# Patient Record
Sex: FEMALE | Race: WHITE | Employment: FULL TIME | URBAN - METROPOLITAN AREA
[De-identification: names, ages, dates, MRNs, and addresses within clinical notes are randomized per-mention and may not be internally consistent; named-entity substitution may affect disease eponyms.]

---

## 2019-12-27 ENCOUNTER — HOSPITAL ENCOUNTER (EMERGENCY)
Age: 54
Discharge: HOME | End: 2019-12-27
Attending: EMERGENCY MEDICINE
Payer: COMMERCIAL

## 2019-12-27 ENCOUNTER — APPOINTMENT (OUTPATIENT)
Dept: ULTRASOUND IMAGING | Age: 54
End: 2019-12-27
Attending: EMERGENCY MEDICINE
Payer: COMMERCIAL

## 2019-12-27 VITALS
TEMPERATURE: 98 F | BODY MASS INDEX: 29.23 KG/M2 | HEIGHT: 63 IN | WEIGHT: 165 LBS | OXYGEN SATURATION: 99 % | SYSTOLIC BLOOD PRESSURE: 147 MMHG | DIASTOLIC BLOOD PRESSURE: 98 MMHG | HEART RATE: 89 BPM | RESPIRATION RATE: 18 BRPM

## 2019-12-27 DIAGNOSIS — K52.9 GASTROENTERITIS, ACUTE: Primary | ICD-10-CM

## 2019-12-27 LAB
ALBUMIN SERPL-MCNC: 3.9 G/DL (ref 3.5–5)
ALBUMIN/GLOB SERPL: 1 {RATIO} (ref 1.1–2.2)
ALP SERPL-CCNC: 90 U/L (ref 45–117)
ALT SERPL-CCNC: 32 U/L (ref 12–78)
ANION GAP SERPL CALC-SCNC: 4 MMOL/L (ref 5–15)
APPEARANCE UR: CLEAR
AST SERPL-CCNC: 28 U/L (ref 15–37)
BACTERIA URNS QL MICRO: NEGATIVE /HPF
BASOPHILS # BLD: 0 K/UL (ref 0–0.1)
BASOPHILS NFR BLD: 1 % (ref 0–1)
BILIRUB SERPL-MCNC: 0.5 MG/DL (ref 0.2–1)
BILIRUB UR QL: NEGATIVE
BUN SERPL-MCNC: 10 MG/DL (ref 6–20)
BUN/CREAT SERPL: 15 (ref 12–20)
CALCIUM SERPL-MCNC: 9.5 MG/DL (ref 8.5–10.1)
CHLORIDE SERPL-SCNC: 105 MMOL/L (ref 97–108)
CO2 SERPL-SCNC: 28 MMOL/L (ref 21–32)
COLOR UR: ABNORMAL
COMMENT, HOLDF: NORMAL
CREAT SERPL-MCNC: 0.68 MG/DL (ref 0.55–1.02)
DIFFERENTIAL METHOD BLD: NORMAL
EOSINOPHIL # BLD: 0.1 K/UL (ref 0–0.4)
EOSINOPHIL NFR BLD: 2 % (ref 0–7)
EPITH CASTS URNS QL MICRO: ABNORMAL /LPF
ERYTHROCYTE [DISTWIDTH] IN BLOOD BY AUTOMATED COUNT: 12.1 % (ref 11.5–14.5)
GLOBULIN SER CALC-MCNC: 3.8 G/DL (ref 2–4)
GLUCOSE SERPL-MCNC: 90 MG/DL (ref 65–100)
GLUCOSE UR STRIP.AUTO-MCNC: NEGATIVE MG/DL
HCT VFR BLD AUTO: 45.3 % (ref 35–47)
HGB BLD-MCNC: 14.9 G/DL (ref 11.5–16)
HGB UR QL STRIP: NEGATIVE
HYALINE CASTS URNS QL MICRO: ABNORMAL /LPF (ref 0–5)
IMM GRANULOCYTES # BLD AUTO: 0 K/UL (ref 0–0.04)
IMM GRANULOCYTES NFR BLD AUTO: 0 % (ref 0–0.5)
KETONES UR QL STRIP.AUTO: NEGATIVE MG/DL
LEUKOCYTE ESTERASE UR QL STRIP.AUTO: ABNORMAL
LIPASE SERPL-CCNC: 144 U/L (ref 73–393)
LYMPHOCYTES # BLD: 2 K/UL (ref 0.8–3.5)
LYMPHOCYTES NFR BLD: 29 % (ref 12–49)
MCH RBC QN AUTO: 30.3 PG (ref 26–34)
MCHC RBC AUTO-ENTMCNC: 32.9 G/DL (ref 30–36.5)
MCV RBC AUTO: 92.3 FL (ref 80–99)
MONOCYTES # BLD: 0.9 K/UL (ref 0–1)
MONOCYTES NFR BLD: 13 % (ref 5–13)
NEUTS SEG # BLD: 3.7 K/UL (ref 1.8–8)
NEUTS SEG NFR BLD: 55 % (ref 32–75)
NITRITE UR QL STRIP.AUTO: NEGATIVE
NRBC # BLD: 0 K/UL (ref 0–0.01)
NRBC BLD-RTO: 0 PER 100 WBC
PH UR STRIP: 7 [PH] (ref 5–8)
PLATELET # BLD AUTO: 317 K/UL (ref 150–400)
PMV BLD AUTO: 9.2 FL (ref 8.9–12.9)
POTASSIUM SERPL-SCNC: 4 MMOL/L (ref 3.5–5.1)
PROT SERPL-MCNC: 7.7 G/DL (ref 6.4–8.2)
PROT UR STRIP-MCNC: NEGATIVE MG/DL
RBC # BLD AUTO: 4.91 M/UL (ref 3.8–5.2)
RBC #/AREA URNS HPF: ABNORMAL /HPF (ref 0–5)
SAMPLES BEING HELD,HOLD: NORMAL
SODIUM SERPL-SCNC: 137 MMOL/L (ref 136–145)
SP GR UR REFRACTOMETRY: 1.02 (ref 1–1.03)
UR CULT HOLD, URHOLD: NORMAL
UROBILINOGEN UR QL STRIP.AUTO: 0.2 EU/DL (ref 0.2–1)
WBC # BLD AUTO: 6.8 K/UL (ref 3.6–11)
WBC URNS QL MICRO: ABNORMAL /HPF (ref 0–4)

## 2019-12-27 PROCEDURE — 81001 URINALYSIS AUTO W/SCOPE: CPT

## 2019-12-27 PROCEDURE — 76705 ECHO EXAM OF ABDOMEN: CPT

## 2019-12-27 PROCEDURE — 36415 COLL VENOUS BLD VENIPUNCTURE: CPT

## 2019-12-27 PROCEDURE — 83690 ASSAY OF LIPASE: CPT

## 2019-12-27 PROCEDURE — 85025 COMPLETE CBC W/AUTO DIFF WBC: CPT

## 2019-12-27 PROCEDURE — 96375 TX/PRO/DX INJ NEW DRUG ADDON: CPT

## 2019-12-27 PROCEDURE — 80053 COMPREHEN METABOLIC PANEL: CPT

## 2019-12-27 PROCEDURE — 99283 EMERGENCY DEPT VISIT LOW MDM: CPT

## 2019-12-27 PROCEDURE — 96374 THER/PROPH/DIAG INJ IV PUSH: CPT

## 2019-12-27 PROCEDURE — 74011250636 HC RX REV CODE- 250/636: Performed by: EMERGENCY MEDICINE

## 2019-12-27 RX ORDER — TRAZODONE HYDROCHLORIDE 150 MG/1
150 TABLET ORAL
COMMUNITY

## 2019-12-27 RX ORDER — FAMOTIDINE 10 MG/ML
20 INJECTION INTRAVENOUS
Status: COMPLETED | OUTPATIENT
Start: 2019-12-27 | End: 2019-12-27

## 2019-12-27 RX ORDER — ONDANSETRON 4 MG/1
4 TABLET, ORALLY DISINTEGRATING ORAL
Qty: 10 TAB | Refills: 0 | Status: SHIPPED | OUTPATIENT
Start: 2019-12-27 | End: 2021-01-04 | Stop reason: ALTCHOICE

## 2019-12-27 RX ORDER — DESVENLAFAXINE 100 MG/1
1 TABLET, EXTENDED RELEASE ORAL DAILY
COMMUNITY

## 2019-12-27 RX ORDER — ONDANSETRON 2 MG/ML
4 INJECTION INTRAMUSCULAR; INTRAVENOUS
Status: COMPLETED | OUTPATIENT
Start: 2019-12-27 | End: 2019-12-27

## 2019-12-27 RX ORDER — OMEPRAZOLE 20 MG/1
20 CAPSULE, DELAYED RELEASE ORAL AS NEEDED
COMMUNITY

## 2019-12-27 RX ADMIN — ONDANSETRON 4 MG: 2 INJECTION INTRAMUSCULAR; INTRAVENOUS at 13:02

## 2019-12-27 RX ADMIN — FAMOTIDINE 20 MG: 10 INJECTION, SOLUTION INTRAVENOUS at 13:02

## 2019-12-27 RX ADMIN — SODIUM CHLORIDE 1000 ML: 900 INJECTION, SOLUTION INTRAVENOUS at 13:02

## 2019-12-27 NOTE — ED PROVIDER NOTES
48 y.o. female with no significant past medical history who presents from home with chief complaint of abdominal pain. Pt complains of RUQ abdominal pain with associated dizziness, nausea, vomiting, and diarrhea for the past 5 days. Pt notes symptoms onset with nausea, vomiting, and \"heartburn\" 5 days ago and then had onset of diarrhea 3 days ago. Pt states the vomiting and diarrhea subsided yesterday, but the other symptoms have persisted. Pt was evaluated at [facility] today, and referred to the ED. Pt reports recent sick contact with similar symptoms. She notes that she began taking Omeprazole beginning Tuesday with some relief. She denies hx of lung problems, and denies suspicious food intake, hematemesis, or blood in stool. There are no other acute medical concerns at this time. Social hx: Denies tobacco use. EtOH use- 1 glass of wine/day. Denies illicit drug use. Surgical hx: hysterectomy, oophorectomy   PCP: No primary care provider on file. Note written by Miguelina Perla, as dictated by Garcia Coy MD 12:42 PM            The history is provided by the patient. No  was used. No past medical history on file. No past surgical history on file. No family history on file.     Social History     Socioeconomic History    Marital status: Not on file     Spouse name: Not on file    Number of children: Not on file    Years of education: Not on file    Highest education level: Not on file   Occupational History    Not on file   Social Needs    Financial resource strain: Not on file    Food insecurity:     Worry: Not on file     Inability: Not on file    Transportation needs:     Medical: Not on file     Non-medical: Not on file   Tobacco Use    Smoking status: Not on file   Substance and Sexual Activity    Alcohol use: Not on file    Drug use: Not on file    Sexual activity: Not on file   Lifestyle    Physical activity:     Days per week: Not on file Minutes per session: Not on file    Stress: Not on file   Relationships    Social connections:     Talks on phone: Not on file     Gets together: Not on file     Attends Holiness service: Not on file     Active member of club or organization: Not on file     Attends meetings of clubs or organizations: Not on file     Relationship status: Not on file    Intimate partner violence:     Fear of current or ex partner: Not on file     Emotionally abused: Not on file     Physically abused: Not on file     Forced sexual activity: Not on file   Other Topics Concern    Not on file   Social History Narrative    Not on file         ALLERGIES: Morphine and Sulfa (sulfonamide antibiotics)    Review of Systems   Constitutional: Negative for chills and fever. Eyes: Negative for visual disturbance. Respiratory: Negative for cough. Cardiovascular: Negative for chest pain. Gastrointestinal: Positive for abdominal pain, diarrhea, nausea and vomiting. Negative for blood in stool. Genitourinary: Negative for hematuria. Musculoskeletal: Negative for arthralgias, myalgias and neck pain. Skin: Negative for color change and rash. Neurological: Positive for dizziness. All other systems reviewed and are negative. Vitals:    12/27/19 1204   BP: (!) 147/98   Pulse: 89   Resp: 18   Temp: 98 °F (36.7 °C)   SpO2: 99%   Weight: 74.8 kg (165 lb)   Height: 5' 3\" (1.6 m)            Physical Exam  Vitals signs and nursing note reviewed. Constitutional:       Appearance: She is well-developed. HENT:      Head: Normocephalic and atraumatic. Eyes:      Conjunctiva/sclera: Conjunctivae normal.   Neck:      Musculoskeletal: Normal range of motion. Cardiovascular:      Rate and Rhythm: Normal rate and regular rhythm. Pulses: Normal pulses. Heart sounds: Normal heart sounds. No murmur. Pulmonary:      Effort: Pulmonary effort is normal. No respiratory distress. Breath sounds: Normal breath sounds.  No stridor. No wheezing, rhonchi or rales. Chest:      Chest wall: No tenderness. Abdominal:      General: Bowel sounds are normal. There is no distension. Palpations: Abdomen is soft. Tenderness: There is tenderness. There is no guarding or rebound. Comments: Minimal right upper quadrant tenderness to palpation   Musculoskeletal: Normal range of motion. Skin:     General: Skin is warm and dry. Neurological:      Mental Status: She is alert and oriented to person, place, and time. MDM  Number of Diagnoses or Management Options  Gastroenteritis, acute:   Diagnosis management comments: Suspect these are symptoms of gastroenteritis. Patient has not vomited today and has not had any more diarrhea. However given she was sent over here for possible cholecystitis and has mild right upper quadrant tenderness to palpation I will obtain an ultrasound. We will try Pepcid and Zofran per patient's request.       Amount and/or Complexity of Data Reviewed  Clinical lab tests: ordered and reviewed  Tests in the radiology section of CPT®: ordered and reviewed    Patient Progress  Patient progress: stable         Procedures    2:42 PM  Patient's results have been reviewed with them. Patient and/or family have verbally conveyed their understanding and agreement of the patient's signs, symptoms, diagnosis, treatment and prognosis and additionally agree to follow up as recommended or return to the Emergency Room should their condition change prior to follow-up. Discharge instructions have also been provided to the patient with some educational information regarding their diagnosis as well a list of reasons why they would want to return to the ER prior to their follow-up appointment should their condition change.

## 2019-12-27 NOTE — DISCHARGE INSTRUCTIONS
Patient Education        Gastroenteritis: Care Instructions  Your Care Instructions    Gastroenteritis is an illness that may cause nausea, vomiting, and diarrhea. It is sometimes called \"stomach flu. \" It can be caused by bacteria or a virus. You will probably begin to feel better in 1 to 2 days. In the meantime, get plenty of rest and make sure you do not become dehydrated. Dehydration occurs when your body loses too much fluid. Follow-up care is a key part of your treatment and safety. Be sure to make and go to all appointments, and call your doctor if you are having problems. It's also a good idea to know your test results and keep a list of the medicines you take. How can you care for yourself at home? · If your doctor prescribed antibiotics, take them as directed. Do not stop taking them just because you feel better. You need to take the full course of antibiotics. · Drink plenty of fluids to prevent dehydration, enough so that your urine is light yellow or clear like water. Choose water and other caffeine-free clear liquids until you feel better. If you have kidney, heart, or liver disease and have to limit fluids, talk with your doctor before you increase your fluid intake. · Drink fluids slowly, in frequent, small amounts, because drinking too much too fast can cause vomiting. · Begin eating mild foods, such as dry toast, yogurt, applesauce, bananas, and rice. Avoid spicy, hot, or high-fat foods, and do not drink alcohol or caffeine for a day or two. Do not drink milk or eat ice cream until you are feeling better. How to prevent gastroenteritis  · Keep hot foods hot and cold foods cold. · Do not eat meats, dressings, salads, or other foods that have been kept at room temperature for more than 2 hours. · Use a thermometer to check your refrigerator. It should be between 34°F and 40°F.  · Defrost meats in the refrigerator or microwave, not on the kitchen counter.   · Keep your hands and your kitchen clean. Wash your hands, cutting boards, and countertops with hot soapy water frequently. · Cook meat until it is well done. · Do not eat raw eggs or uncooked sauces made with raw eggs. · Do not take chances. If food looks or tastes spoiled, throw it out. When should you call for help? Call 911 anytime you think you may need emergency care. For example, call if:    · You vomit blood or what looks like coffee grounds.     · You passed out (lost consciousness).     · You pass maroon or very bloody stools.    Call your doctor now or seek immediate medical care if:    · You have severe belly pain.     · You have signs of needing more fluids. You have sunken eyes, a dry mouth, and pass only a little dark urine.     · You feel like you are going to faint.     · You have increased belly pain that does not go away in 1 to 2 days.     · You have new or increased nausea, or you are vomiting.     · You have a new or higher fever.     · Your stools are black and tarlike or have streaks of blood.    Watch closely for changes in your health, and be sure to contact your doctor if:    · You are dizzy or lightheaded.     · You urinate less than usual, or your urine is dark yellow or brown.     · You do not feel better with each day that goes by. Where can you learn more? Go to http://sivakumar-ale.info/. Enter N142 in the search box to learn more about \"Gastroenteritis: Care Instructions. \"  Current as of: June 9, 2019  Content Version: 12.2  © 7419-2903 [employer]. Care instructions adapted under license by [employer] (which disclaims liability or warranty for this information). If you have questions about a medical condition or this instruction, always ask your healthcare professional. [place] [address] any warranty or liability for your use of this information.

## 2019-12-27 NOTE — LETTER
[address] 
[facility] EMERGENCY DEPT 
[address] [place] [id number] [phone] Work/School Note Date: 12/27/2019 To Whom It May concern: 
 
Alok Harris was seen and treated today in the emergency room by the following provider(s): 
Attending Provider: Aranza Stafford MD.   
 
Alok Harris may return to work on 12/28/19. Sincerely, Bar Man

## 2020-11-12 ENCOUNTER — APPOINTMENT (OUTPATIENT)
Dept: GENERAL RADIOLOGY | Age: 55
End: 2020-11-12
Attending: EMERGENCY MEDICINE
Payer: COMMERCIAL

## 2020-11-12 ENCOUNTER — HOSPITAL ENCOUNTER (EMERGENCY)
Age: 55
Discharge: HOME | End: 2020-11-12
Attending: EMERGENCY MEDICINE
Payer: COMMERCIAL

## 2020-11-12 VITALS
TEMPERATURE: 98.1 F | BODY MASS INDEX: 30.07 KG/M2 | SYSTOLIC BLOOD PRESSURE: 144 MMHG | HEART RATE: 106 BPM | DIASTOLIC BLOOD PRESSURE: 90 MMHG | OXYGEN SATURATION: 93 % | RESPIRATION RATE: 19 BRPM | WEIGHT: 169.75 LBS

## 2020-11-12 DIAGNOSIS — R07.9 RIGHT-SIDED CHEST PAIN: Primary | ICD-10-CM

## 2020-11-12 DIAGNOSIS — R00.0 SINUS TACHYCARDIA: ICD-10-CM

## 2020-11-12 LAB
ALBUMIN SERPL-MCNC: 3.7 G/DL (ref 3.5–5)
ALBUMIN/GLOB SERPL: 1 {RATIO} (ref 1.1–2.2)
ALP SERPL-CCNC: 83 U/L (ref 45–117)
ALT SERPL-CCNC: 59 U/L (ref 12–78)
ANION GAP SERPL CALC-SCNC: 8 MMOL/L (ref 5–15)
AST SERPL-CCNC: 42 U/L (ref 15–37)
BASOPHILS # BLD: 0.1 K/UL (ref 0–0.1)
BASOPHILS NFR BLD: 1 % (ref 0–1)
BILIRUB SERPL-MCNC: 0.4 MG/DL (ref 0.2–1)
BNP SERPL-MCNC: 14 PG/ML (ref 0–125)
BUN SERPL-MCNC: 16 MG/DL (ref 6–20)
BUN/CREAT SERPL: 17 (ref 12–20)
CALCIUM SERPL-MCNC: 9 MG/DL (ref 8.5–10.1)
CHLORIDE SERPL-SCNC: 103 MMOL/L (ref 97–108)
CO2 SERPL-SCNC: 28 MMOL/L (ref 21–32)
CREAT SERPL-MCNC: 0.96 MG/DL (ref 0.55–1.02)
D DIMER PPP FEU-MCNC: 0.32 MG/L FEU (ref 0–0.65)
DIFFERENTIAL METHOD BLD: ABNORMAL
EOSINOPHIL # BLD: 0.1 K/UL (ref 0–0.4)
EOSINOPHIL NFR BLD: 1 % (ref 0–7)
ERYTHROCYTE [DISTWIDTH] IN BLOOD BY AUTOMATED COUNT: 12.6 % (ref 11.5–14.5)
GLOBULIN SER CALC-MCNC: 3.6 G/DL (ref 2–4)
GLUCOSE SERPL-MCNC: 124 MG/DL (ref 65–100)
HCT VFR BLD AUTO: 45.3 % (ref 35–47)
HGB BLD-MCNC: 14.4 G/DL (ref 11.5–16)
IMM GRANULOCYTES # BLD AUTO: 0 K/UL (ref 0–0.04)
IMM GRANULOCYTES NFR BLD AUTO: 1 % (ref 0–0.5)
LYMPHOCYTES # BLD: 3.1 K/UL (ref 0.8–3.5)
LYMPHOCYTES NFR BLD: 37 % (ref 12–49)
MAGNESIUM SERPL-MCNC: 1.9 MG/DL (ref 1.6–2.4)
MCH RBC QN AUTO: 29.5 PG (ref 26–34)
MCHC RBC AUTO-ENTMCNC: 31.8 G/DL (ref 30–36.5)
MCV RBC AUTO: 92.8 FL (ref 80–99)
MONOCYTES # BLD: 0.7 K/UL (ref 0–1)
MONOCYTES NFR BLD: 8 % (ref 5–13)
NEUTS SEG # BLD: 4.5 K/UL (ref 1.8–8)
NEUTS SEG NFR BLD: 53 % (ref 32–75)
NRBC # BLD: 0 K/UL (ref 0–0.01)
NRBC BLD-RTO: 0 PER 100 WBC
PLATELET # BLD AUTO: 332 K/UL (ref 150–400)
PMV BLD AUTO: 9.2 FL (ref 8.9–12.9)
POTASSIUM SERPL-SCNC: 3.6 MMOL/L (ref 3.5–5.1)
PROT SERPL-MCNC: 7.3 G/DL (ref 6.4–8.2)
RBC # BLD AUTO: 4.88 M/UL (ref 3.8–5.2)
SODIUM SERPL-SCNC: 139 MMOL/L (ref 136–145)
TROPONIN I SERPL-MCNC: <0.05 NG/ML
WBC # BLD AUTO: 8.5 K/UL (ref 3.6–11)

## 2020-11-12 PROCEDURE — 71046 X-RAY EXAM CHEST 2 VIEWS: CPT

## 2020-11-12 PROCEDURE — 83880 ASSAY OF NATRIURETIC PEPTIDE: CPT

## 2020-11-12 PROCEDURE — 85379 FIBRIN DEGRADATION QUANT: CPT

## 2020-11-12 PROCEDURE — 93005 ELECTROCARDIOGRAM TRACING: CPT

## 2020-11-12 PROCEDURE — 80053 COMPREHEN METABOLIC PANEL: CPT

## 2020-11-12 PROCEDURE — 83735 ASSAY OF MAGNESIUM: CPT

## 2020-11-12 PROCEDURE — 84484 ASSAY OF TROPONIN QUANT: CPT

## 2020-11-12 PROCEDURE — 36415 COLL VENOUS BLD VENIPUNCTURE: CPT

## 2020-11-12 PROCEDURE — 99285 EMERGENCY DEPT VISIT HI MDM: CPT

## 2020-11-12 PROCEDURE — 85025 COMPLETE CBC W/AUTO DIFF WBC: CPT

## 2020-11-12 RX ORDER — AMLODIPINE BESYLATE 5 MG/1
5 TABLET ORAL DAILY
COMMUNITY
End: 2021-01-04 | Stop reason: ALTCHOICE

## 2020-11-12 RX ORDER — ARIPIPRAZOLE 2 MG/1
2 TABLET ORAL DAILY
COMMUNITY

## 2020-11-12 RX ORDER — MINERAL OIL
180 ENEMA (ML) RECTAL
COMMUNITY
End: 2021-01-04 | Stop reason: ALTCHOICE

## 2020-11-12 NOTE — Clinical Note
[address] [facility] EMERGENCY DEPT 
[address] [facility] [place] [id number] 
[phone] Work/School Note Date: 11/12/2020 To Whom It May concern: 
 
 
Gabby Dyson was seen and treated today in the emergency room by the following provider(s): 
Attending Provider: Kang Boyd MD.   
 
Gabby Dyson is excused from work/school on 11/12/20. She is clear to return to work/school on 11/13/20.    
 
 
Sincerely, 
 
 
 
 
Jagdeep Balderrama MD

## 2020-11-12 NOTE — ED NOTES
Pt given discharge instructions by Dr Rhoda Paul she verbalizes an understanding pt stable at time of discharge ambulates to ren

## 2020-11-12 NOTE — ED TRIAGE NOTES
Pt assisted to treatment area she states that on Tuesday about 1430 she was outside working with the animals and she began to have right lower chest pain with a little bit of nausea. She states that the pain has continued along with some dizziness and just not feeling right. She denies any vomiting, SOB or diaphoresis with the pain. She has not taken anything for the pain.

## 2020-11-12 NOTE — ED PROVIDER NOTES
The history is provided by the patient. Chest Pain (Angina)    This is a new problem. The current episode started 2 days ago. The problem has not changed since onset. Duration of episode(s) is 2 days. The problem occurs constantly. The pain is associated with exertion. The pain is mild. The quality of the pain is described as pressure-like. The pain does not radiate. Associated symptoms include dizziness and malaise/fatigue. Pertinent negatives include no vomiting. She has tried nothing for the symptoms. Risk factors include hypertension. Dizziness   Associated symptoms include chest pain. Pertinent negatives include no vomiting. Past Medical History:   Diagnosis Date    Acid reflux     Hypertension        Past Surgical History:   Procedure Laterality Date    HX HYSTERECTOMY      HX OOPHORECTOMY           History reviewed. No pertinent family history. Social History     Socioeconomic History    Marital status: SINGLE     Spouse name: Not on file    Number of children: Not on file    Years of education: Not on file    Highest education level: Not on file   Occupational History    Not on file   Social Needs    Financial resource strain: Not on file    Food insecurity     Worry: Not on file     Inability: Not on file    Transportation needs     Medical: Not on file     Non-medical: Not on file   Tobacco Use    Smoking status: Current Every Day Smoker     Packs/day: 0.25    Smokeless tobacco: Never Used   Substance and Sexual Activity    Alcohol use:  Yes     Alcohol/week: 7.0 standard drinks     Types: 7 Glasses of wine per week    Drug use: Never    Sexual activity: Not on file   Lifestyle    Physical activity     Days per week: Not on file     Minutes per session: Not on file    Stress: Not on file   Relationships    Social connections     Talks on phone: Not on file     Gets together: Not on file     Attends Muslim service: Not on file     Active member of club or organization: Not on file     Attends meetings of clubs or organizations: Not on file     Relationship status: Not on file    Intimate partner violence     Fear of current or ex partner: Not on file     Emotionally abused: Not on file     Physically abused: Not on file     Forced sexual activity: Not on file   Other Topics Concern    Not on file   Social History Narrative    Not on file         ALLERGIES: Morphine and Sulfa (sulfonamide antibiotics)    Review of Systems   Constitutional: Positive for malaise/fatigue. Cardiovascular: Positive for chest pain. Gastrointestinal: Negative for vomiting. Neurological: Positive for dizziness. All other systems reviewed and are negative. Vitals:    11/12/20 0730   BP: (!) 161/93   Pulse: (!) 107   Resp: 16   SpO2: 97%            Physical Exam  Vitals signs and nursing note reviewed. Constitutional:       General: She is not in acute distress. Appearance: She is well-developed. HENT:      Head: Normocephalic and atraumatic. Eyes:      Conjunctiva/sclera: Conjunctivae normal.   Neck:      Musculoskeletal: Neck supple. Cardiovascular:      Rate and Rhythm: Regular rhythm. Tachycardia present. Heart sounds: Normal heart sounds. Pulmonary:      Effort: Pulmonary effort is normal. No respiratory distress. Breath sounds: Normal breath sounds. Abdominal:      General: There is no distension. Musculoskeletal: Normal range of motion. General: No deformity. Skin:     General: Skin is warm and dry. Neurological:      Mental Status: She is alert. Cranial Nerves: No cranial nerve deficit. Psychiatric:         Behavior: Behavior normal.          MDM     72-year-old female presents with atypical chest pain starting 2 days ago. Troponin is negative despite ongoing symptoms. There are nonspecific T wave abnormalities on her EKG without any previous available studies. I suspect this could be her baseline or secondary to LVH.   Low risk by Nam Fleming criteria for PE but has some low-grade tachycardia so D-dimer used for restratification and was negative. Doubt pulmonary embolus. She is otherwise low risk for ACS but with EKG abnormality will refer to cardiology for repeat study and follow-up. Discussed importance of making appointment. Plan to follow up with PCP as needed and return precautions discussed for worsening or new concerning symptoms. Procedures    EKG: Rate 103, sinus tachycardia. Nonspecific inferolateral TWA.

## 2020-11-13 LAB
ATRIAL RATE: 103 BPM
CALCULATED P AXIS, ECG09: 66 DEGREES
CALCULATED R AXIS, ECG10: 38 DEGREES
CALCULATED T AXIS, ECG11: 31 DEGREES
DIAGNOSIS, 93000: NORMAL
P-R INTERVAL, ECG05: 124 MS
Q-T INTERVAL, ECG07: 348 MS
QRS DURATION, ECG06: 94 MS
QTC CALCULATION (BEZET), ECG08: 455 MS
VENTRICULAR RATE, ECG03: 103 BPM

## 2020-12-27 ENCOUNTER — HOSPITAL ENCOUNTER (EMERGENCY)
Age: 55
Discharge: HOME | End: 2020-12-27
Attending: STUDENT IN AN ORGANIZED HEALTH CARE EDUCATION/TRAINING PROGRAM
Payer: COMMERCIAL

## 2020-12-27 ENCOUNTER — APPOINTMENT (OUTPATIENT)
Dept: GENERAL RADIOLOGY | Age: 55
End: 2020-12-27
Attending: STUDENT IN AN ORGANIZED HEALTH CARE EDUCATION/TRAINING PROGRAM
Payer: COMMERCIAL

## 2020-12-27 ENCOUNTER — APPOINTMENT (OUTPATIENT)
Dept: CT IMAGING | Age: 55
End: 2020-12-27
Attending: STUDENT IN AN ORGANIZED HEALTH CARE EDUCATION/TRAINING PROGRAM
Payer: COMMERCIAL

## 2020-12-27 VITALS
RESPIRATION RATE: 18 BRPM | WEIGHT: 176.15 LBS | HEART RATE: 92 BPM | DIASTOLIC BLOOD PRESSURE: 94 MMHG | SYSTOLIC BLOOD PRESSURE: 167 MMHG | BODY MASS INDEX: 31.21 KG/M2 | HEIGHT: 63 IN | OXYGEN SATURATION: 95 % | TEMPERATURE: 97.6 F

## 2020-12-27 DIAGNOSIS — I10 HYPERTENSION, UNSPECIFIED TYPE: ICD-10-CM

## 2020-12-27 DIAGNOSIS — R51.9 ACUTE NONINTRACTABLE HEADACHE, UNSPECIFIED HEADACHE TYPE: Primary | ICD-10-CM

## 2020-12-27 LAB
ALBUMIN SERPL-MCNC: 3.4 G/DL (ref 3.5–5)
ALBUMIN/GLOB SERPL: 1 {RATIO} (ref 1.1–2.2)
ALP SERPL-CCNC: 91 U/L (ref 45–117)
ALT SERPL-CCNC: 39 U/L (ref 12–78)
ANION GAP SERPL CALC-SCNC: 10 MMOL/L (ref 5–15)
AST SERPL-CCNC: 28 U/L (ref 15–37)
BASOPHILS # BLD: 0.1 K/UL (ref 0–0.1)
BASOPHILS NFR BLD: 1 % (ref 0–1)
BILIRUB SERPL-MCNC: 0.1 MG/DL (ref 0.2–1)
BUN SERPL-MCNC: 10 MG/DL (ref 6–20)
BUN/CREAT SERPL: 13 (ref 12–20)
CALCIUM SERPL-MCNC: 8.9 MG/DL (ref 8.5–10.1)
CHLORIDE SERPL-SCNC: 106 MMOL/L (ref 97–108)
CO2 SERPL-SCNC: 26 MMOL/L (ref 21–32)
CREAT SERPL-MCNC: 0.76 MG/DL (ref 0.55–1.02)
DIFFERENTIAL METHOD BLD: ABNORMAL
EOSINOPHIL # BLD: 0.1 K/UL (ref 0–0.4)
EOSINOPHIL NFR BLD: 1 % (ref 0–7)
ERYTHROCYTE [DISTWIDTH] IN BLOOD BY AUTOMATED COUNT: 12.1 % (ref 11.5–14.5)
GLOBULIN SER CALC-MCNC: 3.3 G/DL (ref 2–4)
GLUCOSE SERPL-MCNC: 124 MG/DL (ref 65–100)
HCT VFR BLD AUTO: 42.2 % (ref 35–47)
HGB BLD-MCNC: 13.8 G/DL (ref 11.5–16)
IMM GRANULOCYTES # BLD AUTO: 0.1 K/UL (ref 0–0.04)
IMM GRANULOCYTES NFR BLD AUTO: 1 % (ref 0–0.5)
LYMPHOCYTES # BLD: 3.5 K/UL (ref 0.8–3.5)
LYMPHOCYTES NFR BLD: 39 % (ref 12–49)
MCH RBC QN AUTO: 30.2 PG (ref 26–34)
MCHC RBC AUTO-ENTMCNC: 32.7 G/DL (ref 30–36.5)
MCV RBC AUTO: 92.3 FL (ref 80–99)
MONOCYTES # BLD: 0.8 K/UL (ref 0–1)
MONOCYTES NFR BLD: 9 % (ref 5–13)
NEUTS SEG # BLD: 4.6 K/UL (ref 1.8–8)
NEUTS SEG NFR BLD: 50 % (ref 32–75)
NRBC # BLD: 0 K/UL (ref 0–0.01)
NRBC BLD-RTO: 0 PER 100 WBC
PLATELET # BLD AUTO: 357 K/UL (ref 150–400)
PMV BLD AUTO: 9.4 FL (ref 8.9–12.9)
POTASSIUM SERPL-SCNC: 3.8 MMOL/L (ref 3.5–5.1)
PROT SERPL-MCNC: 6.7 G/DL (ref 6.4–8.2)
RBC # BLD AUTO: 4.57 M/UL (ref 3.8–5.2)
SODIUM SERPL-SCNC: 142 MMOL/L (ref 136–145)
TROPONIN I SERPL-MCNC: <0.05 NG/ML
WBC # BLD AUTO: 9.2 K/UL (ref 3.6–11)

## 2020-12-27 PROCEDURE — 71045 X-RAY EXAM CHEST 1 VIEW: CPT

## 2020-12-27 PROCEDURE — 99284 EMERGENCY DEPT VISIT MOD MDM: CPT

## 2020-12-27 PROCEDURE — 80053 COMPREHEN METABOLIC PANEL: CPT

## 2020-12-27 PROCEDURE — 84484 ASSAY OF TROPONIN QUANT: CPT

## 2020-12-27 PROCEDURE — 93005 ELECTROCARDIOGRAM TRACING: CPT

## 2020-12-27 PROCEDURE — 70450 CT HEAD/BRAIN W/O DYE: CPT

## 2020-12-27 PROCEDURE — 36415 COLL VENOUS BLD VENIPUNCTURE: CPT

## 2020-12-27 PROCEDURE — 85025 COMPLETE CBC W/AUTO DIFF WBC: CPT

## 2020-12-27 NOTE — ED TRIAGE NOTES
Pt reports elevated BP for the past week. Pt reports headache and flashes of light. Pt takes Amlodipine 5mg at night.

## 2020-12-27 NOTE — ED PROVIDER NOTES
Patient is a 79-year-old female presented emergency department with elevated blood pressure and associated headaches, vision changes. Patient states that over the last week she has noticed that she has had increased blood pressure readings sometimes in the 190s over 110s also started to have headaches that she states is different than her migraine headaches that she typically gets. She denies any upper or lower extremity weakness numbness or tingling states that her migraine headaches are normally left side behind the left eye rise over this last week she describes a global headache where she is now seeing flashes of light even when her eyes are closed. Patient takes amlodipine 5 mg daily she says that she recently had approximately 30 pound weight gain as well as been smoking cigarettes for the last 4 months. Patient says that she went to go for a walk yesterday and during her walk she became extremely short of breath. Past Medical History:   Diagnosis Date    Acid reflux     Hypertension        Past Surgical History:   Procedure Laterality Date    HX HYSTERECTOMY      HX OOPHORECTOMY           History reviewed. No pertinent family history. Social History     Socioeconomic History    Marital status: SINGLE     Spouse name: Not on file    Number of children: Not on file    Years of education: Not on file    Highest education level: Not on file   Occupational History    Not on file   Social Needs    Financial resource strain: Not on file    Food insecurity     Worry: Not on file     Inability: Not on file    Transportation needs     Medical: Not on file     Non-medical: Not on file   Tobacco Use    Smoking status: Current Every Day Smoker     Packs/day: 0.25    Smokeless tobacco: Never Used   Substance and Sexual Activity    Alcohol use:  Yes     Alcohol/week: 7.0 standard drinks     Types: 7 Glasses of wine per week    Drug use: Never    Sexual activity: Not on file   Lifestyle    Physical activity     Days per week: Not on file     Minutes per session: Not on file    Stress: Not on file   Relationships    Social connections     Talks on phone: Not on file     Gets together: Not on file     Attends Baptist service: Not on file     Active member of club or organization: Not on file     Attends meetings of clubs or organizations: Not on file     Relationship status: Not on file    Intimate partner violence     Fear of current or ex partner: Not on file     Emotionally abused: Not on file     Physically abused: Not on file     Forced sexual activity: Not on file   Other Topics Concern    Not on file   Social History Narrative    Not on file         ALLERGIES: Morphine and Sulfa (sulfonamide antibiotics)    Review of Systems   Eyes: Positive for visual disturbance. Respiratory: Positive for shortness of breath. Neurological: Positive for headaches. Negative for dizziness, tremors, syncope, facial asymmetry, speech difficulty, weakness and numbness. All other systems reviewed and are negative. Vitals:    12/27/20 1804   BP: (!) 172/101   Pulse: (!) 101   Resp: 18   Temp: 97.6 °F (36.4 °C)   SpO2: 97%   Weight: 79.9 kg (176 lb 2.4 oz)   Height: 5' 3\" (1.6 m)            Physical Exam  Vitals signs and nursing note reviewed. HENT:      Head: Normocephalic and atraumatic. Eyes:      Pupils: Pupils are equal, round, and reactive to light. Neck:      Musculoskeletal: Normal range of motion. Cardiovascular:      Rate and Rhythm: Normal rate. Pulmonary:      Effort: Pulmonary effort is normal.      Breath sounds: Normal breath sounds. Abdominal:      General: Abdomen is flat. Palpations: Abdomen is soft. Musculoskeletal: Normal range of motion. Skin:     General: Skin is warm and dry. Neurological:      General: No focal deficit present. Mental Status: She is alert and oriented to person, place, and time.    Psychiatric:         Mood and Affect: Mood is anxious. MDM  Number of Diagnoses or Management Options  Diagnosis management comments: A/P: Hypertensive urgency, hypertensive urgency, essential hypertension. 82-year-old female presenting with headaches without neuro findings on exam patient's recent lifestyle changes of including weight gain and smoking likely contributing to patient's increase in blood pressure. Have room to manage with her amlodipine discussed work-up here in the ER will consist of labs, chest x-ray, CT head, EKG. Patient is agreeable with plan. Amount and/or Complexity of Data Reviewed  Clinical lab tests: ordered and reviewed  Tests in the radiology section of CPT®: ordered and reviewed    Risk of Complications, Morbidity, and/or Mortality  Presenting problems: moderate  Diagnostic procedures: moderate  Management options: moderate    Patient Progress  Patient progress: stable         Procedures    ED EKG interpretation:  Rhythm: normal sinus rhythm; and regular . Rate (approx.): 98; Axis: normal; P wave: normal; QRS interval: normal ; ST/T wave: non-specific changes; in  Lead: II ; Other findings: borderline ekg. EKG documented by Tre Light MD,     6:58 PM  Change of shift. Care of patient signed over to David Bedolla MD.  Bedside handoff complete. Awaiting labs/imaging.

## 2020-12-27 NOTE — ED NOTES
Purposeful rounding completed. Ongoing plan of care discussed and pts concerns/questions addressed. Pt informed of time factors with lab/imaging study results. Pt resting on the stretcher in a position of comfort. Call bell within reach. Lights dimmed for comfort.

## 2020-12-28 LAB
ATRIAL RATE: 98 BPM
CALCULATED P AXIS, ECG09: 63 DEGREES
CALCULATED R AXIS, ECG10: 35 DEGREES
CALCULATED T AXIS, ECG11: 21 DEGREES
DIAGNOSIS, 93000: NORMAL
P-R INTERVAL, ECG05: 120 MS
Q-T INTERVAL, ECG07: 362 MS
QRS DURATION, ECG06: 92 MS
QTC CALCULATION (BEZET), ECG08: 462 MS
VENTRICULAR RATE, ECG03: 98 BPM

## 2020-12-28 NOTE — ED NOTES
7:01 PM  Change of shift. Care of patient taken over from [facility]; H&P reviewed, bedside handoff complete. Awaiting CT, labs. Reassess. 7:29 PM  Patient reassessed and headache improved. CT head and CXR normal.  Labs normal.  Will discharge home.

## 2020-12-28 NOTE — ED NOTES
The patient was discharged home by emergency department physician. A discharge plan has been developed. A  was not involved in the process. Patient given paper copy of discharge instructions with 0 paper prescriptions and 0 electronic prescriptions. Patient verbalized understanding of discharge instructions. Patient given a current medication reconciliation list with discharge instruction. No  work/school note given. Patient alert and oriented and in no acute distress at discharge. Patient ambulated out of ED with steady gait, no assistance needed.

## 2021-01-04 ENCOUNTER — OFFICE VISIT (OUTPATIENT)
Dept: CARDIOLOGY CLINIC | Age: 56
End: 2021-01-04
Payer: COMMERCIAL

## 2021-01-04 VITALS
BODY MASS INDEX: 31.36 KG/M2 | HEIGHT: 63 IN | OXYGEN SATURATION: 97 % | HEART RATE: 81 BPM | RESPIRATION RATE: 14 BRPM | WEIGHT: 177 LBS | SYSTOLIC BLOOD PRESSURE: 160 MMHG | DIASTOLIC BLOOD PRESSURE: 104 MMHG

## 2021-01-04 DIAGNOSIS — R07.9 CHEST PAIN, UNSPECIFIED TYPE: Primary | ICD-10-CM

## 2021-01-04 DIAGNOSIS — I10 ESSENTIAL HYPERTENSION: ICD-10-CM

## 2021-01-04 DIAGNOSIS — R06.02 SOB (SHORTNESS OF BREATH): ICD-10-CM

## 2021-01-04 PROCEDURE — 99204 OFFICE O/P NEW MOD 45 MIN: CPT | Performed by: INTERNAL MEDICINE

## 2021-01-04 PROCEDURE — 93000 ELECTROCARDIOGRAM COMPLETE: CPT | Performed by: INTERNAL MEDICINE

## 2021-01-04 RX ORDER — PROPRANOLOL HYDROCHLORIDE 120 MG/1
120 CAPSULE, EXTENDED RELEASE ORAL DAILY
Qty: 90 CAP | Refills: 3 | Status: SHIPPED | OUTPATIENT
Start: 2021-01-04 | End: 2022-01-26

## 2021-01-04 RX ORDER — PROPRANOLOL HYDROCHLORIDE 60 MG/1
60 TABLET ORAL 3 TIMES DAILY
COMMUNITY
End: 2021-01-04 | Stop reason: CLARIF

## 2021-01-04 RX ORDER — PROPRANOLOL HYDROCHLORIDE 60 MG/1
60 CAPSULE, EXTENDED RELEASE ORAL DAILY
COMMUNITY
End: 2021-01-04 | Stop reason: SDUPTHER

## 2021-01-04 RX ORDER — NAPROXEN SODIUM 220 MG
220 TABLET ORAL AS NEEDED
COMMUNITY

## 2021-01-04 NOTE — PROGRESS NOTES
All orders entered per VO of Dr. Naomie Rascon:        Increase Inderal LA 120mg po daily. See Dr. Naomie Rascon in  2-3 weeks with same day Echo for [facility]. Per VO of Dr. Todd Carlin: Visit date not found    Future Appointments   Date Time Provider [address]   1/22/2021  3:00 PM LEANN SHARP AMB   1/22/2021  4:00 PM Flores Joyner MD [facility]       Requested Prescriptions     Pending Prescriptions Disp Refills    propranolol LA (Inderal LA) 120 mg SR capsule 90 Cap 3     Sig: Take 1 Cap by mouth daily.

## 2021-01-04 NOTE — PATIENT INSTRUCTIONS
Increase Inderal LA 120mg po daily.  
 
See Dr. Joyner in 2-3 weeks with same day Echo for  HTN, SOB.

## 2021-01-04 NOTE — PROGRESS NOTES
Prosper Santamaria is a 50 y.o. female    Chief Complaint   [facility]   [facility]    Chest Pain (Angina)    Dizziness   PCP stopped amlodipine and changed to propranolol 3 nights ago    Chest pain : indigestion   SOB : yes upon exertion   Dizziness : not since hospital visit   Edema : no  Refills :     Visit Vitals  BP (!) 152/110 (BP 1 Location: Left arm, BP Patient Position: Sitting)   Pulse 81   Resp 14   Ht 5' 3\" (1.6 m)   Wt 177 lb (80.3 kg)   SpO2 97%   BMI 31.35 kg/m²       1. Have you been to the ER, urgent care clinic since your last visit? Hospitalized since your last visit? yes [facility] for arrhythmias     2. Have you seen or consulted any other health care providers outside of the [address] [place] [address] since your last visit? Include any pap smears or colon screening.  Yes PCP

## 2021-01-04 NOTE — PROGRESS NOTES
Reason for Consult: Chest pain, HTN    Referring: Vineet Argueta MD    HPI: Merlin Notice is a 50 y.o. female with past medical history significant for acid reflux, depression, hypertension is here for evaluation symptoms of hypertension, and chest pain. She had a first ER visit in November 2020 when she presented with symptoms of right-sided lower chest wall pain anterior chest wall nonradiating. She was evaluated and ruled out for pulmonary embolism as well as ACS. Her blood pressure at that time was high. She was discharged home. She did not have any recurrence of symptoms of chest pain since then. She was back in ER on December 27, 2020 with symptoms of blurred vision, nausea. Her blood pressure was further elevated in the 170 range. He was again ruled out for PE as well as ACS. She was discharged to follow-up with cardiology as well as primary care physician. She was taking amlodipine prior to these episodes of ER visits. Recently her primary care physician had discontinued amlodipine as it was not controlling her blood pressure. She was started on Inderal long-acting 60 mg p.o. daily which she started on January 1 and that has improved her blood pressure although it continues to be elevated. She has some exertional shortness of breath however she thinks it could be due to wearing a mask as well as significant weight gain in past 1 year of about 30 pounds. She has been less active in past 1 year due to the pandemic as well as has been busy at work. She started smoking tobacco few weeks ago. No previous cardiac history. Family history significant for hypertension in mother. Her siblings have dyslipidemia. EKG in our office today demonstrated normal sinus rhythm, normal axis, normal intervals, normal ST segment.     Records from primary care physician office including PCP note from December 30, 2020 as well as lab results from December 31, 2020 including CBC, CMP, TSH, vitamin B12, hep C antibody, hemoglobin A1c, fasting lipid profile was personally reviewed. There is no significant abnormality noted. Plan:    1. Shortness of breath: Findings could be due to mass clearing as well as weight gain. Symptoms has been slightly progressive as she is having symptoms when she is bending over to tie her shoelaces. Will check echocardiogram for cardiac function. 2.  Chest pain: This was only once and has not had any recurrence. At this time we can wait and observe. 3.  Hypertension: Blood pressure continues to be elevated. At PCP office she had a differential in blood pressure of about 20 mmHg between right and left arm. In our office we did not notice any significant differential between both the arm blood pressure. Currently she is on propranolol long-acting 60 mg p.o. daily which I believe we can increase to 120 mg for better control of blood pressure and if needed add lisinopril for Diovan. She is unable to take diuretic due to history of interstitial cystitis. 4.  Weight gain: I think weight gain is probably contributing to her high blood pressure as well as shortness of breath. Recommend increasing activity paying attention to food choices. Improving sleep. She does not recall snoring at night. 5. See Dr. Shanae Holliday in 1 month with same day Echo. ATTENTION:   This medical record was transcribed using an electronic medical records/speech recognition system. Although proofread, it may and can contain electronic, spelling and other errors. Corrections may be executed at a later time. Please feel free to contact us for any clarifications as needed. Past Medical History:   Diagnosis Date    Acid reflux     Arrhythmia     Hypertension             Past Surgical History:   Procedure Laterality Date    HX HYSTERECTOMY      HX OOPHORECTOMY               History reviewed. No pertinent family history.         Social History     Socioeconomic History    Marital status: SINGLE     Spouse name: Not on file    Number of children: Not on file    Years of education: Not on file    Highest education level: Not on file   Occupational History    Not on file   Social Needs    Financial resource strain: Not on file    Food insecurity     Worry: Not on file     Inability: Not on file    Transportation needs     Medical: Not on file     Non-medical: Not on file   Tobacco Use    Smoking status: Former Smoker     Packs/day: 0.25     Quit date: 2020     Years since quittin.0    Smokeless tobacco: Never Used   Substance and Sexual Activity    Alcohol use: Yes     Alcohol/week: 7.0 standard drinks     Types: 7 Glasses of wine per week    Drug use: Never    Sexual activity: Not on file   Lifestyle    Physical activity     Days per week: Not on file     Minutes per session: Not on file    Stress: Not on file   Relationships    Social connections     Talks on phone: Not on file     Gets together: Not on file     Attends Gnosticist service: Not on file     Active member of club or organization: Not on file     Attends meetings of clubs or organizations: Not on file     Relationship status: Not on file    Intimate partner violence     Fear of current or ex partner: Not on file     Emotionally abused: Not on file     Physically abused: Not on file     Forced sexual activity: Not on file   Other Topics Concern    Not on file   Social History Narrative    Not on file         Allergies   Allergen Reactions    Morphine Rash and Itching    Sulfa (Sulfonamide Antibiotics) Rash and Itching            Current Outpatient Medications   Medication Sig Dispense Refill    AMB COMPOUND VULVODYNIA FORMULATION Vulvodynia Ointment 2.5 in petrolatum - CPPA   2.5% Amitriptyline; 2.5% Baclofen; 2.5% Gabapentin; 5% Lidocaine; 0.2% Estradiol; 0.02% Testosterone in Petrolatum   use to affected area  1-2 x daily      naproxen sodium (NAPROSYN) 220 mg tablet Take 220 mg by mouth as needed.       propranolol LA (Inderal LA) 60 mg SR capsule Take 60 mg by mouth daily.  ARIPiprazole (ABILIFY) 2 mg tablet Take 2 mg by mouth daily.  omeprazole (PRILOSEC) 20 mg capsule Take 20 mg by mouth as needed.  Desvenlafaxine (PRISTIQ) 100 mg Tb24 Take 1 Tab by mouth daily.  traZODone (DESYREL) 150 mg tablet Take 150 mg by mouth nightly. ROS:  12 point review of systems was performed. All negative except for HPI     Physical Exam:  Visit Vitals  BP (!) 160/104 (BP 1 Location: Right arm, BP Patient Position: Sitting)   Pulse 81   Resp 14   Ht 5' 3\" (1.6 m)   Wt 177 lb (80.3 kg)   SpO2 97%   BMI 31.35 kg/m²       Gen:  Well-developed, well-nourished, in no acute distress  HEENT:  Pink conjunctivae, PERRL, hearing intact to voice, moist mucous membranes  Neck:  Supple, without masses, thyroid non-tender  Resp:  No accessory muscle use, clear breath sounds without wheezes rales or rhonchi  Card:  No murmurs, normal S1, S2 without thrills, bruits or peripheral edema  Abd:  Soft, non-tender, non-distended, normoactive bowel sounds are present, no palpable organomegaly and no detectable hernias  Lymph:  No cervical or inguinal adenopathy  Musc:  No cyanosis or clubbing  Skin:  No rashes or ulcers, skin turgor is good  Neuro:  Cranial nerves are grossly intact, no focal motor weakness, follows commands appropriately  Psych:  Good insight, oriented to person, place and time, alert     Labs:     Lab Results   Component Value Date/Time    WBC 9.2 12/27/2020 06:49 PM    HGB 13.8 12/27/2020 06:49 PM    HCT 42.2 12/27/2020 06:49 PM    PLATELET 148 65/12/8218 06:49 PM    MCV 92.3 12/27/2020 06:49 PM     Lab Results   Component Value Date/Time    Glucose 124 (H) 12/27/2020 06:49 PM    Creatinine 0.76 12/27/2020 06:49 PM      No results found for: CHOL, CHOLPOCT, HDL, LDL, LDLC, LDLCPOC, LDLCEXT, TRIGL, TGLPOCT, CHHD, CHHDX  Lab Results   Component Value Date/Time    ALT (SGPT) 39 12/27/2020 06:49 PM    Alk. phosphatase 91 12/27/2020 06:49 PM    Bilirubin, total 0.1 (L) 12/27/2020 06:49 PM    Albumin 3.4 (L) 12/27/2020 06:49 PM    Protein, total 6.7 12/27/2020 06:49 PM    PLATELET 092 71/04/5885 06:49 PM     No results found for: INR, INREXT, PTMR, PTP, PT1, PT2, INREXT   Lab Results   Component Value Date/Time    GFR est non-AA >60 12/27/2020 06:49 PM    GFR est AA >60 12/27/2020 06:49 PM    Creatinine 0.76 12/27/2020 06:49 PM    BUN 10 12/27/2020 06:49 PM    Sodium 142 12/27/2020 06:49 PM    Potassium 3.8 12/27/2020 06:49 PM    Chloride 106 12/27/2020 06:49 PM    CO2 26 12/27/2020 06:49 PM    Magnesium 1.9 11/12/2020 07:42 AM     No results found for: PSA, PSA2, PSAR1, Ruthie Easley, PSAR3, [id number], [id number]  No results found for: TSH, TSH2, TSH3, TSHP, TSHELE, TSHEXT, TT3, T3U, T3UP, FRT3, FT3, FT4, FT4P, T4, T4P, FT4T, TT7, TSHEXT   Lab Results   Component Value Date/Time    Glucose 124 (H) 12/27/2020 06:49 PM      Lab Results   Component Value Date/Time    Troponin-I, Qt. <0.05 12/27/2020 06:49 PM      Lab Results   Component Value Date/Time    NT pro-BNP 14 11/12/2020 07:42 AM      Lab Results   Component Value Date/Time    Sodium 142 12/27/2020 06:49 PM    Potassium 3.8 12/27/2020 06:49 PM    Chloride 106 12/27/2020 06:49 PM    CO2 26 12/27/2020 06:49 PM    Anion gap 10 12/27/2020 06:49 PM    Glucose 124 (H) 12/27/2020 06:49 PM    BUN 10 12/27/2020 06:49 PM    Creatinine 0.76 12/27/2020 06:49 PM    BUN/Creatinine ratio 13 12/27/2020 06:49 PM    GFR est AA >60 12/27/2020 06:49 PM    GFR est non-AA >60 12/27/2020 06:49 PM    Calcium 8.9 12/27/2020 06:49 PM      Lab Results   Component Value Date/Time    Sodium 142 12/27/2020 06:49 PM    Potassium 3.8 12/27/2020 06:49 PM    Chloride 106 12/27/2020 06:49 PM    CO2 26 12/27/2020 06:49 PM    Anion gap 10 12/27/2020 06:49 PM    Glucose 124 (H) 12/27/2020 06:49 PM    BUN 10 12/27/2020 06:49 PM    Creatinine 0.76 12/27/2020 06:49 PM    BUN/Creatinine ratio 13 12/27/2020 06:49 PM    GFR est AA >60 12/27/2020 06:49 PM    GFR est non-AA >60 12/27/2020 06:49 PM    Calcium 8.9 12/27/2020 06:49 PM    Bilirubin, total 0.1 (L) 12/27/2020 06:49 PM    ALT (SGPT) 39 12/27/2020 06:49 PM    Alk. phosphatase 91 12/27/2020 06:49 PM    Protein, total 6.7 12/27/2020 06:49 PM    Albumin 3.4 (L) 12/27/2020 06:49 PM    Globulin 3.3 12/27/2020 06:49 PM    A-G Ratio 1.0 (L) 12/27/2020 06:49 PM      No results found for: HBA1C, SVI4PFQK, HGBE8, DYD7MVUS, PIU0DSPV      No results for input(s): CPK, CKMB, TROIQ in the last 72 hours. No lab exists for component: CKQMB, CPKMB        Problem List:     Problem List  Never Reviewed          Codes Class Noted    Arrhythmia ICD-10-CM: I49.9  ICD-9-CM: 427.9  Unknown                Juan Pablo Martinez MD, [facility]

## 2021-01-22 ENCOUNTER — ANCILLARY PROCEDURE (OUTPATIENT)
Dept: CARDIOLOGY CLINIC | Age: 56
End: 2021-01-22
Payer: COMMERCIAL

## 2021-01-22 ENCOUNTER — OFFICE VISIT (OUTPATIENT)
Dept: CARDIOLOGY CLINIC | Age: 56
End: 2021-01-22
Payer: COMMERCIAL

## 2021-01-22 VITALS
DIASTOLIC BLOOD PRESSURE: 98 MMHG | WEIGHT: 178 LBS | HEIGHT: 63 IN | BODY MASS INDEX: 31.54 KG/M2 | SYSTOLIC BLOOD PRESSURE: 158 MMHG

## 2021-01-22 VITALS
HEART RATE: 101 BPM | DIASTOLIC BLOOD PRESSURE: 94 MMHG | BODY MASS INDEX: 31.54 KG/M2 | RESPIRATION RATE: 14 BRPM | HEIGHT: 63 IN | SYSTOLIC BLOOD PRESSURE: 146 MMHG | OXYGEN SATURATION: 96 % | WEIGHT: 178 LBS

## 2021-01-22 DIAGNOSIS — I10 ESSENTIAL HYPERTENSION: ICD-10-CM

## 2021-01-22 DIAGNOSIS — I10 ESSENTIAL HYPERTENSION: Primary | ICD-10-CM

## 2021-01-22 DIAGNOSIS — R06.02 SOB (SHORTNESS OF BREATH): ICD-10-CM

## 2021-01-22 DIAGNOSIS — R07.9 CHEST PAIN, UNSPECIFIED TYPE: ICD-10-CM

## 2021-01-22 PROCEDURE — 93306 TTE W/DOPPLER COMPLETE: CPT | Performed by: INTERNAL MEDICINE

## 2021-01-22 PROCEDURE — 99213 OFFICE O/P EST LOW 20 MIN: CPT | Performed by: INTERNAL MEDICINE

## 2021-01-22 RX ORDER — LISINOPRIL 10 MG/1
TABLET ORAL
COMMUNITY
Start: 2021-01-13

## 2021-01-22 RX ORDER — NIFEDIPINE 60 MG/1
TABLET, EXTENDED RELEASE ORAL
COMMUNITY
Start: 2021-01-13

## 2021-01-22 NOTE — PROGRESS NOTES
Eryn Torres is a 50 y.o. female    Chief Complaint   Patient presents with    Chest Pain (Angina)    Hypertension    Shortness of Breath     Chest pain : NO  SOB : NO  Dizziness : NO  Edema : NO  Refills : NO    Visit Vitals  BP (!) 146/94 (BP 1 Location: Right arm, BP Patient Position: Sitting)   Pulse (!) 101   Resp 14   Ht 5' 3\" (1.6 m)   Wt 178 lb (80.7 kg)   SpO2 96%   BMI 31.53 kg/m²       1. Have you been to the ER, urgent care clinic since your last visit? Hospitalized since your last visit? No    2. Have you seen or consulted any other health care providers outside of the [address] [place] [address] since your last visit? Include any pap smears or colon screening.  No

## 2021-01-22 NOTE — PROGRESS NOTES
Office Follow-up    NAME: Vineet Carey   :  1965  MRM:  [id number]    Date:  2021            Assessment:     Problem List  Never Reviewed          Codes Class Noted    Arrhythmia ICD-10-CM: I49.9  ICD-9-CM: 427.9  Unknown                 Plan:     1. Hypertension: Blood pressure is better controlled but continues to be elevated. Continue nifedipine extended release and start lisinopril 10 mg p.o. daily from today. Continue to increase activities and lose weight. 2. Shortness of breath: This is likely related to weight gain and hypertension. Once she is more active with lose weight and blood pressure improves her shortness of breath is also improved. Cardiac function is normal.  3. Chest pain: These have resolved. 4. See Dr. Jason Reyes as needed. ATTENTION:   This medical record was transcribed using an electronic medical records/speech recognition system. Although proofread, it may and can contain electronic, spelling and other errors. Corrections may be executed at a later time. Please feel free to contact us for any clarifications as needed. Subjective:     Vineet Carey, a 50y.o. year-old who presents for followup. I had seen her a few weeks ago for shortness of breath and hypertension. We had increased her propranolol however because of her history of Raynaud's her toes turn blue with the increased dose of propranolol as well as her blood pressure did not respond well to increase dosage. She discontinued the propranolol and is now on nifedipine extended release along with lisinopril which she has not started yet. Her blood pressure is well controlled on nifedipine extended release. She continues to have shortness of breath on bending over. Her echocardiogram today demonstrated normal cardiac function without any valvular disease.     Exam:     Physical Exam:  Visit Vitals  BP (!) 146/94 (BP 1 Location: Right arm, BP Patient Position: Sitting)   Pulse (!) 101   Resp 14 Ht 5' 3\" (1.6 m)   Wt 178 lb (80.7 kg)   SpO2 96%   BMI 31.53 kg/m²     General appearance - alert, well appearing, and in no distress  Mental status - affect appropriate to mood  Eyes - sclera anicteric, moist mucous membranes  Neck - supple, no significant adenopathy  Chest - clear to auscultation, no wheezes, rales or rhonchi  Heart - normal rate, regular rhythm, normal S1, S2, no murmurs, rubs, clicks or gallops  Abdomen - soft, nontender, nondistended, no masses or organomegaly  Extremities - peripheral pulses normal, no pedal edema  Skin - normal coloration  no rashes    Medications:     Current Outpatient Medications   Medication Sig    NIFEdipine ER (PROCARDIA XL) 60 mg ER tablet TAKE 1 TABLET BY MOUTH EVERY DAY FOR HYPERTENSION    lisinopriL (PRINIVIL, ZESTRIL) 10 mg tablet TAKE 1 TABLET BY MOUTH EVERY DAY FOR HYPERTENSION    AMB COMPOUND VULVODYNIA FORMULATION Vulvodynia Ointment 2.5 in petrolatum - CPPA   2.5% Amitriptyline; 2.5% Baclofen; 2.5% Gabapentin; 5% Lidocaine; 0.2% Estradiol; 0.02% Testosterone in Petrolatum   use to affected area  1-2 x daily    naproxen sodium (NAPROSYN) 220 mg tablet Take 220 mg by mouth as needed.  ARIPiprazole (ABILIFY) 2 mg tablet Take 2 mg by mouth daily.  omeprazole (PRILOSEC) 20 mg capsule Take 20 mg by mouth as needed.  Desvenlafaxine (PRISTIQ) 100 mg Tb24 Take 1 Tab by mouth daily.  traZODone (DESYREL) 150 mg tablet Take 150 mg by mouth nightly.  propranolol LA (Inderal LA) 120 mg SR capsule Take 1 Cap by mouth daily. No current facility-administered medications for this visit. Diagnostic Data Review:       No specialty comments available.       Lab Review:   No results found for: CHOL, CHOLX, CHLST, CHOLV, [id number], HDL, HDLP, LDL, LDLC, DLDLP, TGLX, TRIGL, TRIGP, CHHD, [facility]  Lab Results   Component Value Date/Time    Creatinine 0.76 12/27/2020 06:49 PM     Lab Results   Component Value Date/Time    BUN 10 12/27/2020 06:49 PM     Lab Results   Component Value Date/Time    Potassium 3.8 12/27/2020 06:49 PM     No results found for: HBA1C, HGBE8, [id number]  Lab Results   Component Value Date/Time    HGB 13.8 12/27/2020 06:49 PM     Lab Results   Component Value Date/Time    PLATELET 640 31/15/3253 06:49 PM     No results for input(s): CPK, CKMB, TROIQ in the last 72 hours. No lab exists for component: CKQMB, CPKMB             ___________________________________________________    Cassidy Martinez.  Steff Lu MD, [facility]

## 2021-01-25 LAB
ECHO AO ASC DIAM: 2.64 CM
ECHO AO ROOT DIAM: 2.89 CM
ECHO AV AREA PEAK VELOCITY: 3.61 CM2
ECHO AV AREA VTI: 3.39 CM2
ECHO AV AREA/BSA PEAK VELOCITY: 2 CM2/M2
ECHO AV AREA/BSA VTI: 1.8 CM2/M2
ECHO AV MEAN GRADIENT: 3.47 MMHG
ECHO AV PEAK GRADIENT: 5.07 MMHG
ECHO AV PEAK VELOCITY: 112.57 CM/S
ECHO AV VTI: 21.67 CM
ECHO LA AREA 4C: 10.87 CM2
ECHO LA MAJOR AXIS: 3.73 CM
ECHO LA MINOR AXIS: 2.03 CM
ECHO LA VOL 2C: 32.71 ML (ref 22–52)
ECHO LA VOL 4C: 22.77 ML (ref 22–52)
ECHO LA VOL BP: 29.03 ML (ref 22–52)
ECHO LA VOL/BSA BIPLANE: 15.77 ML/M2 (ref 16–28)
ECHO LA VOLUME INDEX A2C: 17.77 ML/M2 (ref 16–28)
ECHO LA VOLUME INDEX A4C: 12.37 ML/M2 (ref 16–28)
ECHO LV E' LATERAL VELOCITY: 11.03 CM/S
ECHO LV E' SEPTAL VELOCITY: 8.26 CM/S
ECHO LV EDV A2C: 43.13 ML
ECHO LV EDV A4C: 49.05 ML
ECHO LV EDV BP: 46.88 ML (ref 56–104)
ECHO LV EDV INDEX A4C: 26.7 ML/M2
ECHO LV EDV INDEX BP: 25.5 ML/M2
ECHO LV EDV NDEX A2C: 23.4 ML/M2
ECHO LV EJECTION FRACTION A2C: 60 PERCENT
ECHO LV EJECTION FRACTION A4C: 63 PERCENT
ECHO LV EJECTION FRACTION BIPLANE: 61.9 PERCENT (ref 55–100)
ECHO LV ESV A2C: 17.28 ML
ECHO LV ESV A4C: 17.97 ML
ECHO LV ESV BP: 17.84 ML (ref 19–49)
ECHO LV ESV INDEX A2C: 9.4 ML/M2
ECHO LV ESV INDEX A4C: 9.8 ML/M2
ECHO LV ESV INDEX BP: 9.7 ML/M2
ECHO LV INTERNAL DIMENSION DIASTOLIC: 3.42 CM (ref 3.9–5.3)
ECHO LV INTERNAL DIMENSION SYSTOLIC: 2.35 CM
ECHO LV IVSD: 1.01 CM (ref 0.6–0.9)
ECHO LV MASS 2D: 84.3 G (ref 67–162)
ECHO LV MASS INDEX 2D: 45.8 G/M2 (ref 43–95)
ECHO LV POSTERIOR WALL DIASTOLIC: 0.77 CM (ref 0.6–0.9)
ECHO LVOT DIAM: 2.09 CM
ECHO LVOT PEAK GRADIENT: 5.59 MMHG
ECHO LVOT PEAK VELOCITY: 118.22 CM/S
ECHO LVOT SV: 73.4 ML
ECHO LVOT VTI: 21.33 CM
ECHO MV A VELOCITY: 79.97 CM/S
ECHO MV AREA PHT: 4.84 CM2
ECHO MV E DECELERATION TIME (DT): 156.7 MS
ECHO MV E VELOCITY: 100.62 CM/S
ECHO MV E/A RATIO: 1.26
ECHO MV E/E' LATERAL: 9.12
ECHO MV E/E' RATIO (AVERAGED): 10.65
ECHO MV E/E' SEPTAL: 12.18
ECHO MV PRESSURE HALF TIME (PHT): 45.44 MS
ECHO RV TAPSE: 2.21 CM (ref 1.5–2)
LA VOL DISK BP: 27.86 ML (ref 22–52)

## 2022-01-26 ENCOUNTER — HOSPITAL ENCOUNTER (EMERGENCY)
Age: 57
Discharge: HOME | End: 2022-01-26
Attending: EMERGENCY MEDICINE
Payer: COMMERCIAL

## 2022-01-26 VITALS
OXYGEN SATURATION: 97 % | WEIGHT: 178.13 LBS | RESPIRATION RATE: 16 BRPM | HEART RATE: 96 BPM | DIASTOLIC BLOOD PRESSURE: 93 MMHG | BODY MASS INDEX: 31.56 KG/M2 | TEMPERATURE: 98.6 F | SYSTOLIC BLOOD PRESSURE: 153 MMHG | HEIGHT: 63 IN

## 2022-01-26 DIAGNOSIS — H92.02 ACUTE EAR PAIN, LEFT: Primary | ICD-10-CM

## 2022-01-26 PROCEDURE — 99282 EMERGENCY DEPT VISIT SF MDM: CPT

## 2022-01-26 RX ORDER — AMOXICILLIN AND CLAVULANATE POTASSIUM 875; 125 MG/1; MG/1
1 TABLET, FILM COATED ORAL 2 TIMES DAILY
COMMUNITY

## 2022-01-26 RX ORDER — OFLOXACIN 3 MG/ML
5 SOLUTION AURICULAR (OTIC) DAILY
Qty: 10 ML | Refills: 0 | Status: SHIPPED | OUTPATIENT
Start: 2022-01-26

## 2022-01-26 NOTE — ED TRIAGE NOTES
Pt reports left ear pain for 9 days. She saw her PCP, was prescribed Augmentin and cipro drops which hurt, so she stopped using them. Pt reports feeling nauseous and dizzy Monday. Pt reports her right ear beginning to hurt. Pt was told to see ENT, but appointment is distant, so pt came here.

## 2022-01-26 NOTE — DISCHARGE INSTRUCTIONS
We hope that we have addressed all of your medical concerns. The examination and treatment you received in the Emergency Department were for an emergent problem and were not intended as complete care. It is important that you follow up with your healthcare provider(s) for ongoing care. If your symptoms worsen or do not improve as expected, and you are unable to reach your usual health care provider(s), you should return to the Emergency Department. Today's healthcare is undergoing tremendous change, and patient satisfaction surveys are one of the many tools to assess the quality of medical care. You may receive a survey from the [employer] regarding your experience in the Emergency Department. I hope that your experience has been completely positive, particularly the medical care that I provided. As such, please participate in the survey; anything less than excellent does not meet my expectations or intentions. [facility] and [address] [place] [address] participate in nationally recognized quality of care measures. If your blood pressure is greater than 120/80, as reported below, we urge that you seek medical care to address the potential of high blood pressure, commonly known as hypertension. Hypertension can be hereditary or can be caused by certain medical conditions, pain, stress, or \"white coat syndrome. \"       Please make an appointment with your health care provider(s) for follow up of your Emergency Department visit. VITALS:   Patient Vitals for the past 8 hrs:   Temp Pulse Resp BP SpO2   01/26/22 1732 98.6 °F (37 °C) 96 16 (!) 153/93 97 %          Thank you for allowing us to provide you with medical care today. We realize that you have many choices for your emergency care needs. Please choose us in the future for any continued health care needs. Natalie Purcell, [address] [place] [address] [address]   Office: [phone]            No results found for this or any previous visit (from the past 24 hour(s)). No results found.

## 2022-01-26 NOTE — ED PROVIDER NOTES
HPI   50 yo F presents with left ear pain. Has been on augmentin for 8 days for perforated ear infection. Had yellow drainage from ear. Ear is feeling better. C/o \"pings\" and noises in left ear. C/o pain 3/10, improved with ibuprofen. C/o nausea on Monday and dizziness on Monday, recurred again this morning. Has f/u with ENT on . Denies fever, chills. Pt is not diabetic. Past Medical History:   Diagnosis Date    Acid reflux     Arrhythmia     Hypertension        Past Surgical History:   Procedure Laterality Date    HX HYSTERECTOMY      HX OOPHORECTOMY           History reviewed. No pertinent family history. Social History     Socioeconomic History    Marital status: SINGLE     Spouse name: Not on file    Number of children: Not on file    Years of education: Not on file    Highest education level: Not on file   Occupational History    Not on file   Tobacco Use    Smoking status: Former Smoker     Packs/day: 0.25     Quit date: 2020     Years since quittin.0    Smokeless tobacco: Never Used   Substance and Sexual Activity    Alcohol use: Yes     Alcohol/week: 7.0 standard drinks     Types: 7 Glasses of wine per week    Drug use: Never    Sexual activity: Not on file   Other Topics Concern    Not on file   Social History Narrative    Not on file     Social Determinants of Health     Financial Resource Strain:     Difficulty of Paying Living Expenses: Not on file   Food Insecurity:     Worried About Running Out of Food in the Last Year: Not on file    Demetria of Food in the Last Year: Not on file   Transportation Needs:     Lack of Transportation (Medical): Not on file    Lack of Transportation (Non-Medical):  Not on file   Physical Activity:     Days of Exercise per Week: Not on file    Minutes of Exercise per Session: Not on file   Stress:     Feeling of Stress : Not on file   Social Connections:     Frequency of Communication with Friends and Family: Not on file    Frequency of Social Gatherings with Friends and Family: Not on file    Attends Yazdanism Services: Not on file    Active Member of Clubs or Organizations: Not on file    Attends Club or Organization Meetings: Not on file    Marital Status: Not on file   Intimate Partner Violence:     Fear of Current or Ex-Partner: Not on file    Emotionally Abused: Not on file    Physically Abused: Not on file    Sexually Abused: Not on file   Housing Stability:     Unable to Pay for Housing in the Last Year: Not on file    Number of [place] in the Last Year: Not on file    Unstable Housing in the Last Year: Not on file         ALLERGIES: Morphine and Sulfa (sulfonamide antibiotics)    Review of Systems   Constitutional: Negative for chills and fever. HENT: Positive for ear discharge and ear pain. Negative for facial swelling. Respiratory: Negative for cough and shortness of breath. Cardiovascular: Negative for chest pain. Gastrointestinal: Positive for nausea. Negative for abdominal pain, diarrhea and vomiting. Skin: Negative for rash. Neurological: Positive for light-headedness. Negative for dizziness, weakness, numbness and headaches. All other systems reviewed and are negative.       Vitals:    01/26/22 1732   BP: (!) 153/93   Pulse: 96   Resp: 16   Temp: 98.6 °F (37 °C)   SpO2: 97%   Weight: 80.8 kg (178 lb 2.1 oz)   Height: 5' 3\" (1.6 m)            Physical Exam   Physical Examination: General appearance - alert, well appearing, and in no distress, oriented to person, place, and time and normal appearing weight  Eyes - pupils equal and reactive, extraocular eye movements intact  HEENT-right TM normal, left TM with small area of perforation and drainage of clear fluid, no erythema or bulging of TM, no mastoid tenderness or swelling, no facial swelling or erythema  Neck - supple, no significant adenopathy  Chest - clear to auscultation, no wheezes, rales or rhonchi, symmetric air entry  Heart - normal rate, regular rhythm, normal S1, S2, no murmurs, rubs, clicks or gallops  Neurological - alert, oriented, normal speech, no focal findings or movement disorder noted  Musculoskeletal - no joint tenderness, deformity or swelling  Extremities - peripheral pulses normal, no pedal edema, no clubbing or cyanosis  Skin - normal coloration and turgor, no rashes, no suspicious skin lesions noted  MDM  Number of Diagnoses or Management Options  Patient Progress  Patient progress: stable         Procedures    Patient afebrile nontoxic. Vital signs stable. Complains of burning with Cipro HTC drops. Will switch to ofloxacin. Follow-up with ENT return to ED for worsening symptoms.